# Patient Record
Sex: MALE | Race: BLACK OR AFRICAN AMERICAN | URBAN - METROPOLITAN AREA
[De-identification: names, ages, dates, MRNs, and addresses within clinical notes are randomized per-mention and may not be internally consistent; named-entity substitution may affect disease eponyms.]

---

## 2021-04-08 ENCOUNTER — APPOINTMENT (RX ONLY)
Dept: URBAN - METROPOLITAN AREA CLINIC 352 | Facility: CLINIC | Age: 46
Setting detail: DERMATOLOGY
End: 2021-04-08

## 2021-04-08 DIAGNOSIS — Z41.9 ENCOUNTER FOR PROCEDURE FOR PURPOSES OTHER THAN REMEDYING HEALTH STATE, UNSPECIFIED: ICD-10-CM

## 2021-04-08 PROCEDURE — ? COSMETIC CONSULTATION - MICRO-NEEDLING

## 2021-04-08 PROCEDURE — ? MEDICAL CONSULTATION: MICRODERMABRASION

## 2021-04-08 PROCEDURE — ? MEDICAL CONSULTATION: PRODUCTS

## 2021-04-08 ASSESSMENT — LOCATION SIMPLE DESCRIPTION DERM: LOCATION SIMPLE: INFERIOR FOREHEAD

## 2021-04-08 ASSESSMENT — LOCATION ZONE DERM: LOCATION ZONE: FACE

## 2021-04-08 ASSESSMENT — LOCATION DETAILED DESCRIPTION DERM: LOCATION DETAILED: INFERIOR MID FOREHEAD

## 2021-04-23 ENCOUNTER — APPOINTMENT (RX ONLY)
Dept: URBAN - METROPOLITAN AREA CLINIC 352 | Facility: CLINIC | Age: 46
Setting detail: DERMATOLOGY
End: 2021-04-23

## 2021-04-23 DIAGNOSIS — Z41.9 ENCOUNTER FOR PROCEDURE FOR PURPOSES OTHER THAN REMEDYING HEALTH STATE, UNSPECIFIED: ICD-10-CM

## 2021-04-23 PROCEDURE — ? MICRONEEDLING

## 2021-04-23 ASSESSMENT — LOCATION SIMPLE DESCRIPTION DERM: LOCATION SIMPLE: LEFT FOREHEAD

## 2021-04-23 ASSESSMENT — LOCATION DETAILED DESCRIPTION DERM: LOCATION DETAILED: LEFT MEDIAL FOREHEAD

## 2021-04-23 ASSESSMENT — LOCATION ZONE DERM: LOCATION ZONE: FACE

## 2021-04-23 NOTE — PROCEDURE: MICRONEEDLING
Location #2: cheeks and chin
Post-Care Instructions: After the procedure, take precautions agains sun exposure. After 2-3 days patients can return to their regular skin regimen.\\n\\nImmediately post procedure, the laser enzyme gel was applied to the treated area, followed by sheer spf 50.\\n\\n\\nThe patient was given written instructions, as well as samples of foaming facial cleanser, laser enzyme gel, and spf 44 for post procedure care.\\n\\n\\nThe patient will RTO in 1 week for a dermaplane and f/u photos
Depth In Mm: 0.5
Depth In Mm: 1.5
Treatment Number (Optional): 1
Depth In Mm: 2.5
Consent: Written consent obtained, risks reviewed including but not limited to pain, scarring, infection and incomplete improvement.  Patient understands the procedure is cosmetic in nature and will require out of pocket payment.
Topical Anesthesia?: 23% lidocaine, 7% tetracaine
Depth In Mm: 2
Price (Use Numbers Only, No Special Characters Or $): 300.00
Location #3: forehead and nost
Length Of Topical Anesthesia Application (Optional): 15 minutes
Location #1: nasal labial folds, cheeks
Infusions (Optional): hyaluronic acid
Detail Level: Zone

## 2021-05-03 ENCOUNTER — APPOINTMENT (RX ONLY)
Dept: URBAN - METROPOLITAN AREA CLINIC 352 | Facility: CLINIC | Age: 46
Setting detail: DERMATOLOGY
End: 2021-05-03

## 2021-05-03 DIAGNOSIS — Z41.9 ENCOUNTER FOR PROCEDURE FOR PURPOSES OTHER THAN REMEDYING HEALTH STATE, UNSPECIFIED: ICD-10-CM

## 2021-05-03 PROCEDURE — ? MEDICAL CONSULTATION: PRODUCTS

## 2021-05-03 PROCEDURE — ? OTHER (COSMETIC)

## 2021-05-03 ASSESSMENT — LOCATION DETAILED DESCRIPTION DERM: LOCATION DETAILED: LEFT MEDIAL FOREHEAD

## 2021-05-03 ASSESSMENT — LOCATION ZONE DERM: LOCATION ZONE: FACE

## 2021-05-03 ASSESSMENT — LOCATION SIMPLE DESCRIPTION DERM: LOCATION SIMPLE: LEFT FOREHEAD

## 2021-05-03 NOTE — PROCEDURE: OTHER (COSMETIC)
Other (Free Text): The patient was advised that the dark areas currently present are PIH from his skinpen last week.  I advised the patient the areas are still healing and should continue to improve.  \\n\\nThe patient was given samples (see photo in chart) to use with his current daily regimen in order to increase brightening and healing.  \\n\\n\\nThe patient will RTO in 2 weeks for follow up and photos.
Detail Level: Zone

## 2021-05-21 ENCOUNTER — APPOINTMENT (RX ONLY)
Dept: URBAN - METROPOLITAN AREA CLINIC 352 | Facility: CLINIC | Age: 46
Setting detail: DERMATOLOGY
End: 2021-05-21

## 2021-05-21 DIAGNOSIS — Z41.9 ENCOUNTER FOR PROCEDURE FOR PURPOSES OTHER THAN REMEDYING HEALTH STATE, UNSPECIFIED: ICD-10-CM

## 2021-05-21 PROCEDURE — ? OTHER (COSMETIC)

## 2021-05-21 PROCEDURE — ? MEDICAL CONSULTATION: PRODUCTS

## 2021-05-21 ASSESSMENT — LOCATION DETAILED DESCRIPTION DERM
LOCATION DETAILED: LEFT MEDIAL FOREHEAD
LOCATION DETAILED: INFERIOR MID FOREHEAD

## 2021-05-21 ASSESSMENT — LOCATION SIMPLE DESCRIPTION DERM
LOCATION SIMPLE: LEFT FOREHEAD
LOCATION SIMPLE: INFERIOR FOREHEAD

## 2021-05-21 ASSESSMENT — LOCATION ZONE DERM: LOCATION ZONE: FACE

## 2021-05-21 NOTE — PROCEDURE: OTHER (COSMETIC)
Other (Free Text): The patient was advised that during these next few summer months it would be better to maintain and protect from future damage rather than treat and cause possible further hyperpigmentation. \\n\\nHydroquinone was discussed, as well as other brightening skin care products.  \\n\\nI advised the patient to RTO for a VI peel when the hot summer months are over. \\n\\n The patient agreed with maintaining during the summer and will RTO for the peel.
Detail Level: Zone